# Patient Record
Sex: MALE | Race: WHITE | NOT HISPANIC OR LATINO | Employment: STUDENT | URBAN - METROPOLITAN AREA
[De-identification: names, ages, dates, MRNs, and addresses within clinical notes are randomized per-mention and may not be internally consistent; named-entity substitution may affect disease eponyms.]

---

## 2023-09-25 PROBLEM — F40.10 SOCIAL ANXIETY DISORDER: Status: ACTIVE | Noted: 2023-09-25

## 2023-09-25 PROBLEM — G47.21 DELAYED SLEEP PHASE SYNDROME: Status: ACTIVE | Noted: 2023-09-25

## 2023-09-25 PROBLEM — F41.9 ANXIETY AND DEPRESSION: Status: ACTIVE | Noted: 2023-09-25

## 2023-09-25 PROBLEM — F32.A ANXIETY AND DEPRESSION: Status: ACTIVE | Noted: 2023-09-25

## 2023-09-25 PROBLEM — R00.0 TACHYCARDIA, UNSPECIFIED: Status: ACTIVE | Noted: 2023-09-25

## 2023-09-25 PROBLEM — L85.8 KERATOSIS PILARIS: Status: ACTIVE | Noted: 2023-09-25

## 2023-09-25 PROBLEM — E55.9 VITAMIN D DEFICIENCY: Status: ACTIVE | Noted: 2023-09-25

## 2023-09-25 PROBLEM — R63.5 WEIGHT GAIN, ABNORMAL: Status: ACTIVE | Noted: 2023-09-25

## 2023-09-25 RX ORDER — SERTRALINE HYDROCHLORIDE 25 MG/1
1 TABLET, FILM COATED ORAL DAILY
COMMUNITY
Start: 2022-10-13 | End: 2023-11-07 | Stop reason: WASHOUT

## 2023-09-25 RX ORDER — ERGOCALCIFEROL 1.25 MG/1
CAPSULE ORAL
COMMUNITY
Start: 2020-06-02 | End: 2023-10-12 | Stop reason: SDUPTHER

## 2023-09-25 RX ORDER — ESCITALOPRAM OXALATE 5 MG/1
1 TABLET ORAL DAILY
COMMUNITY
Start: 2023-04-11 | End: 2023-11-07 | Stop reason: WASHOUT

## 2023-09-25 RX ORDER — PROPRANOLOL HYDROCHLORIDE 10 MG/1
10 TABLET ORAL DAILY PRN
COMMUNITY
Start: 2023-01-04 | End: 2023-11-07 | Stop reason: WASHOUT

## 2023-09-25 RX ORDER — ACETAMINOPHEN 500 MG
50 TABLET ORAL DAILY
COMMUNITY
Start: 2017-03-09

## 2023-09-25 RX ORDER — BUSPIRONE HYDROCHLORIDE 5 MG/1
1 TABLET ORAL 2 TIMES DAILY
COMMUNITY
Start: 2023-05-16 | End: 2023-11-07 | Stop reason: WASHOUT

## 2023-09-25 RX ORDER — SKIN PROTECTANT 44 G/100G
1 OINTMENT TOPICAL 3 TIMES DAILY
COMMUNITY
Start: 2020-12-07 | End: 2023-11-07 | Stop reason: WASHOUT

## 2023-10-02 ENCOUNTER — APPOINTMENT (OUTPATIENT)
Dept: BEHAVIORAL HEALTH | Facility: CLINIC | Age: 18
End: 2023-10-02
Payer: COMMERCIAL

## 2023-10-02 ENCOUNTER — OFFICE VISIT (OUTPATIENT)
Dept: PRIMARY CARE | Facility: CLINIC | Age: 18
End: 2023-10-02
Payer: COMMERCIAL

## 2023-10-02 VITALS
HEIGHT: 68 IN | WEIGHT: 118 LBS | OXYGEN SATURATION: 98 % | TEMPERATURE: 96.7 F | BODY MASS INDEX: 17.88 KG/M2 | SYSTOLIC BLOOD PRESSURE: 120 MMHG | DIASTOLIC BLOOD PRESSURE: 78 MMHG | HEART RATE: 80 BPM

## 2023-10-02 DIAGNOSIS — F41.9 ANXIETY AND DEPRESSION: ICD-10-CM

## 2023-10-02 DIAGNOSIS — F32.A ANXIETY AND DEPRESSION: ICD-10-CM

## 2023-10-02 DIAGNOSIS — Z13.6 SCREENING FOR CARDIOVASCULAR CONDITION: ICD-10-CM

## 2023-10-02 DIAGNOSIS — Z11.3 ROUTINE SCREENING FOR STI (SEXUALLY TRANSMITTED INFECTION): ICD-10-CM

## 2023-10-02 DIAGNOSIS — Z00.00 PREVENTATIVE HEALTH CARE: Primary | ICD-10-CM

## 2023-10-02 DIAGNOSIS — H61.23 BILATERAL IMPACTED CERUMEN: ICD-10-CM

## 2023-10-02 DIAGNOSIS — K21.9 GASTROESOPHAGEAL REFLUX DISEASE, UNSPECIFIED WHETHER ESOPHAGITIS PRESENT: ICD-10-CM

## 2023-10-02 DIAGNOSIS — E55.9 VITAMIN D DEFICIENCY: ICD-10-CM

## 2023-10-02 PROCEDURE — 99385 PREV VISIT NEW AGE 18-39: CPT | Performed by: STUDENT IN AN ORGANIZED HEALTH CARE EDUCATION/TRAINING PROGRAM

## 2023-10-02 PROCEDURE — 99385 PREV VISIT NEW AGE 18-39: CPT | Mod: 25 | Performed by: STUDENT IN AN ORGANIZED HEALTH CARE EDUCATION/TRAINING PROGRAM

## 2023-10-02 PROCEDURE — 1036F TOBACCO NON-USER: CPT | Performed by: STUDENT IN AN ORGANIZED HEALTH CARE EDUCATION/TRAINING PROGRAM

## 2023-10-02 PROCEDURE — 90471 IMMUNIZATION ADMIN: CPT | Performed by: STUDENT IN AN ORGANIZED HEALTH CARE EDUCATION/TRAINING PROGRAM

## 2023-10-02 RX ORDER — PANTOPRAZOLE SODIUM 20 MG/1
20 TABLET, DELAYED RELEASE ORAL
Qty: 30 TABLET | Refills: 1 | Status: SHIPPED | OUTPATIENT
Start: 2023-10-02 | End: 2023-11-07

## 2023-10-02 ASSESSMENT — PAIN SCALES - GENERAL: PAINLEVEL: 0-NO PAIN

## 2023-10-02 NOTE — PROGRESS NOTES
"Subjective   Alalessandro Patel is a 18 y.o. male who presents for Annual Exam.    HPI:    This is a 18-year-old male presenting as a new patient for physical exam.    History:  Previously saw Dr. Dr. Josiane Gomez as his pediatrician.  Works at [employer].  Took a couple of classes at [employer] previously. Not currently in school.    Depression/anxiety:  Compliant with sertraline 25 mg daily as prescribed.  Sees Roverto Cee CNP with  psych     Nocturnal Burning Sx:  Patient complains of burning sensation through chest occurring only when he lays down at night.  States that this has been ongoing for several years, remembers it first occurring in ninth grade.  Has never taken anything for this issue, no known history of heartburn.  No associated chest pain or shortness of breath.  Does not have similar symptoms at other times during the day or following meals.      JULIEN  Review of systems is essentially negative for all systems except for any identified issues in HPI above.      Objective     /78   Pulse 80   Temp 35.9 °C (96.7 °F)   Ht 1.727 m (5' 8\")   Wt 53.5 kg (118 lb)   SpO2 98%   BMI 17.94 kg/m²      PHYSICAL EXAM    GENERAL  Well-appearing, pleasant and cooperative.  No acute distress.    HEENT  HEAD:   Normocephalic.  Atraumatic.  EYES:  PERRLA.  No scleral icterus or conjunctival injection.  EARS:  +BILATERAL CERUMEN IMPACTION  NECK:  No adenopathy.  No palpable thyroid enlargement or nodules.    THROAT:  Moist oropharynx without tonsillar enlargement or exudates.    LUNGS:    Clear to auscultation bilaterally.  No wheezes, rales, rhonchi.    CARDIAC:  Regular rate and rhythm.  Normal S1S2.  No murmurs/rubs/gallops.    ABDOMEN:  Soft, non-tender, non-distended.  No hepatosplenomegaly.  Normoactive bowel sounds.    MUSCULOSKELETAL:  No gross abnormalities.   No joint swelling or erythema,.  No spinal or paraspinal tenderness to palpation.    EXTREMITIES:  No LE edema or cyanosis.      NEURO          "  Alert and oriented x3. No focal deficits.    PSYCH:          Affect appropriate.           Assessment/Plan   Problem List Items Addressed This Visit          Endocrine/Metabolic    Vitamin D deficiency    Relevant Orders    Vitamin D 25-Hydroxy,Total (for eval of Vitamin D levels)       Mental Health    Anxiety and depression     Well-controlled with current sertraline dose.  Sees mental health provider.  Continue current management.          Other Visit Diagnoses       Preventative health care    -  Primary    Relevant Orders    CBC    Comprehensive metabolic panel    Tsh With Reflex To Free T4 If Abnormal    Lipid panel    Hepatitis C antibody    Screening for cardiovascular condition        Relevant Orders    Lipid panel    Routine screening for STI (sexually transmitted infection)        Relevant Orders    HIV 1/2 Antigen/Antibody Screen with Reflex to Confirmation    C. trachomatis / N. gonorrhoeae, DNA probe    Trichomonas vaginalis, Amplified    Syphilis Screen with Reflex    HSV Type I/II IgM Antibody    HSV1 IgG and HSV2 IgG    Gastroesophageal reflux disease, unspecified whether esophagitis present        Suspect that this is the cause of burning sensation.  No other chest pain/shortness of breath.    Relevant Medications    pantoprazole (Protonix) 20 mg EC tablet    Bilateral impacted cerumen        Noted on physical exam, patient does complain of muffled hearing.  Cerumen lavage performed bilaterally in clinic today.    Relevant Orders    Ear cerumen removal                 Lili Butcher MD

## 2023-10-02 NOTE — ASSESSMENT & PLAN NOTE
Well-controlled with current sertraline dose.  Sees mental health provider.  Continue current management.

## 2023-10-02 NOTE — PROGRESS NOTES
"Last visit on 8/20/23 - began tapering off buspar d/t side effects (dizziness, negative thoughts). Recommended PCP visit and lab work   DSM-5 diagnosis social anxiety  Current Psychotropic Medications: none     HPI   Location/symptoms/domain  Quality: Descriptive language   Severity/impact on functioning   Timing/context   Duration - timeline of illness   Recent Stressors:   Alleviating Factors:   Associated signs and symptoms     Psychiatric Review Of Systems:  Depressive Symptoms:{Depressive symptoms:10339::\"N/A\"}  Manic Symptoms:{Manic Symptoms:87397}  Anxiety Symptoms:{Anxiety Symptoms:63246}  Disordered Eating Symptoms:{Disordered Eating Symptoms:01964}  Inattentive Symptoms:{Inattentive Symptoms:35764}   Hyperactive/Impulsive Symptoms:{Hyperactive/Impulsive Symp[toms:86537}  Oppositional Defiant Symptoms:{Oppositional Defiant Symptoms:52318}  Trauma Symptoms:{Trauma Symptoms:43306}  Conduct Issues:{Conduct Issues:98178}  Psychotic Symptoms:{Psychotic Symptoms:20293}  Developmental Concerns:{Developmental Concerns:09206}  Other Symptoms/Concerns:{Other Symptoms/Concerns:84763}  Delirium/Altered Mental Status Symptoms:{Delirium/Altered Mental Status Symptoms:80333}    Exercise:   Diet/Nutrition:   Support System:          Mental Status Exam:   Appearance: appropriately groomed and dressed. not impaired   Behavior: cooperative and friendly/ pleasant.   Speech: regular rate, rhythm, volume and tone, spontaneous, fluent.   Eye Contact: normal.   Motor Activity: normal.   Affect: appropriate with full range.   Mood: anxious.   Thought Process: logical and linear.   Thought Content: does not endorse having suicidal ideations, does not endorse having homicidal ideations and does not endorse having delusions.   Orientation Impairment: time, person and place.   Memory Impairment: none.   Perception: no hallucinations or delusions   Cognition: no deficits in attention, concentration or language   Insight: fair. "   Judgment: fair     Plan/Recommendations:  Medications: ***  Orders: ***  Follow up: ***  Call  Psychiatry at [phone] with issues.  For [facility] residents, [place] is a 24/7 hotline you can call for assistance [[phone]]. Please call [phone] or go to your closest Emergency Room if you feel unsafe. This includes thoughts of hurting yourself or anyone else, or having other troubles such as hearing voices, seeing visions, or having new and scary thoughts about the people around you.      Patient arrival time   Total time   Record Review: {brief/mod/extensive:96770}

## 2023-10-02 NOTE — PATIENT INSTRUCTIONS
Thank you for coming to see me today.    Flu shot in clinic today.  COVID vaccination also recommended, this is available at most local pharmacies.    Ear lavage in clinic today.  Avoid use of Q-tips in the future as this can push earwax farther back anterior canal.    Go to the lab for fasting blood work, we will call you with the results.    Heartburn medication, pantoprazole, sent to pharmacy.  Take daily as prescribed.

## 2023-10-10 ENCOUNTER — LAB (OUTPATIENT)
Dept: LAB | Facility: LAB | Age: 18
End: 2023-10-10
Payer: COMMERCIAL

## 2023-10-10 DIAGNOSIS — Z13.6 SCREENING FOR CARDIOVASCULAR CONDITION: ICD-10-CM

## 2023-10-10 DIAGNOSIS — E55.9 VITAMIN D DEFICIENCY: ICD-10-CM

## 2023-10-10 DIAGNOSIS — Z00.00 PREVENTATIVE HEALTH CARE: ICD-10-CM

## 2023-10-10 DIAGNOSIS — Z11.3 ROUTINE SCREENING FOR STI (SEXUALLY TRANSMITTED INFECTION): ICD-10-CM

## 2023-10-10 LAB
25(OH)D3 SERPL-MCNC: 10 NG/ML (ref 31–100)
ALBUMIN SERPL-MCNC: 5.1 G/DL (ref 3.5–5)
ALP BLD-CCNC: 54 U/L (ref 35–125)
ALT SERPL-CCNC: 10 U/L (ref 5–40)
ANION GAP SERPL CALC-SCNC: 12 MMOL/L
AST SERPL-CCNC: 14 U/L (ref 5–40)
BILIRUB SERPL-MCNC: 0.3 MG/DL (ref 0.1–1.2)
BUN SERPL-MCNC: 9 MG/DL (ref 8–25)
CALCIUM SERPL-MCNC: 9.7 MG/DL (ref 8.5–10.4)
CHLORIDE SERPL-SCNC: 105 MMOL/L (ref 97–107)
CHOLEST SERPL-MCNC: 115 MG/DL (ref 133–200)
CHOLEST/HDLC SERPL: 2.2 {RATIO}
CO2 SERPL-SCNC: 27 MMOL/L (ref 24–31)
CREAT SERPL-MCNC: 0.8 MG/DL (ref 0.4–1.6)
ERYTHROCYTE [DISTWIDTH] IN BLOOD BY AUTOMATED COUNT: 12.4 % (ref 11.5–14.5)
GFR SERPL CREATININE-BSD FRML MDRD: >90 ML/MIN/1.73M*2
GLUCOSE SERPL-MCNC: 99 MG/DL (ref 65–99)
HCT VFR BLD AUTO: 44.5 % (ref 41–52)
HCV AB SER QL: NONREACTIVE
HDLC SERPL-MCNC: 52 MG/DL
HERPES SIMPLEX VIRUS 1 IGG: <0.2 INDEX
HERPES SIMPLEX VIRUS 2 IGG: <0.2 INDEX
HGB BLD-MCNC: 15.1 G/DL (ref 13.5–17.5)
LDLC SERPL CALC-MCNC: 55 MG/DL (ref 65–130)
MCH RBC QN AUTO: 30.6 PG (ref 26–34)
MCHC RBC AUTO-ENTMCNC: 33.9 G/DL (ref 32–36)
MCV RBC AUTO: 90 FL (ref 80–100)
NRBC BLD-RTO: 0 /100 WBCS (ref 0–0)
PLATELET # BLD AUTO: 165 X10*3/UL (ref 150–450)
PMV BLD AUTO: 11.6 FL (ref 7.5–11.5)
POTASSIUM SERPL-SCNC: 4.1 MMOL/L (ref 3.4–5.1)
PROT SERPL-MCNC: 7.3 G/DL (ref 5.9–7.9)
RBC # BLD AUTO: 4.94 X10*6/UL (ref 4.5–5.9)
SODIUM SERPL-SCNC: 144 MMOL/L (ref 133–145)
T PALLIDUM AB SER QL: NONREACTIVE
TRIGL SERPL-MCNC: 40 MG/DL (ref 40–150)
TSH SERPL DL<=0.05 MIU/L-ACNC: 1.76 MIU/L (ref 0.27–4.2)
WBC # BLD AUTO: 4 X10*3/UL (ref 4.4–11.3)

## 2023-10-10 PROCEDURE — 82306 VITAMIN D 25 HYDROXY: CPT

## 2023-10-10 PROCEDURE — 87661 TRICHOMONAS VAGINALIS AMPLIF: CPT

## 2023-10-10 PROCEDURE — 80053 COMPREHEN METABOLIC PANEL: CPT

## 2023-10-10 PROCEDURE — 86695 HERPES SIMPLEX TYPE 1 TEST: CPT

## 2023-10-10 PROCEDURE — 84443 ASSAY THYROID STIM HORMONE: CPT

## 2023-10-10 PROCEDURE — 86803 HEPATITIS C AB TEST: CPT

## 2023-10-10 PROCEDURE — 87800 DETECT AGNT MULT DNA DIREC: CPT

## 2023-10-10 PROCEDURE — 80061 LIPID PANEL: CPT

## 2023-10-10 PROCEDURE — 86780 TREPONEMA PALLIDUM: CPT

## 2023-10-10 PROCEDURE — 86694 HERPES SIMPLEX NES ANTBDY: CPT

## 2023-10-10 PROCEDURE — 87389 HIV-1 AG W/HIV-1&-2 AB AG IA: CPT

## 2023-10-10 PROCEDURE — 85027 COMPLETE CBC AUTOMATED: CPT

## 2023-10-10 PROCEDURE — 36415 COLL VENOUS BLD VENIPUNCTURE: CPT

## 2023-10-10 PROCEDURE — 86696 HERPES SIMPLEX TYPE 2 TEST: CPT

## 2023-10-11 LAB
C TRACH RRNA SPEC QL NAA+PROBE: NEGATIVE
HIV 1+2 AB+HIV1 P24 AG SERPL QL IA: NONREACTIVE
N GONORRHOEA DNA SPEC QL PROBE+SIG AMP: NEGATIVE
T VAGINALIS RRNA SPEC QL NAA+PROBE: NEGATIVE

## 2023-10-12 LAB — HSV1+2 IGM SER IA-ACNC: 1.02 IV

## 2023-10-12 RX ORDER — ERGOCALCIFEROL 1.25 MG/1
50000 CAPSULE ORAL
Qty: 12 CAPSULE | Refills: 0 | Status: SHIPPED | OUTPATIENT
Start: 2023-10-12 | End: 2024-01-04

## 2023-11-07 ENCOUNTER — OFFICE VISIT (OUTPATIENT)
Dept: PRIMARY CARE | Facility: CLINIC | Age: 18
End: 2023-11-07
Payer: COMMERCIAL

## 2023-11-07 VITALS
TEMPERATURE: 97 F | WEIGHT: 118 LBS | HEIGHT: 68 IN | OXYGEN SATURATION: 99 % | BODY MASS INDEX: 17.88 KG/M2 | DIASTOLIC BLOOD PRESSURE: 70 MMHG | HEART RATE: 73 BPM | SYSTOLIC BLOOD PRESSURE: 110 MMHG

## 2023-11-07 DIAGNOSIS — B00.9 HSV (HERPES SIMPLEX VIRUS) INFECTION: ICD-10-CM

## 2023-11-07 DIAGNOSIS — Z23 ENCOUNTER FOR IMMUNIZATION: ICD-10-CM

## 2023-11-07 DIAGNOSIS — R07.89 CHEST DISCOMFORT: Primary | ICD-10-CM

## 2023-11-07 DIAGNOSIS — E55.9 VITAMIN D DEFICIENCY: ICD-10-CM

## 2023-11-07 PROCEDURE — 1036F TOBACCO NON-USER: CPT | Performed by: STUDENT IN AN ORGANIZED HEALTH CARE EDUCATION/TRAINING PROGRAM

## 2023-11-07 PROCEDURE — 90686 IIV4 VACC NO PRSV 0.5 ML IM: CPT | Performed by: STUDENT IN AN ORGANIZED HEALTH CARE EDUCATION/TRAINING PROGRAM

## 2023-11-07 PROCEDURE — 93010 ELECTROCARDIOGRAM REPORT: CPT | Performed by: STUDENT IN AN ORGANIZED HEALTH CARE EDUCATION/TRAINING PROGRAM

## 2023-11-07 PROCEDURE — 99214 OFFICE O/P EST MOD 30 MIN: CPT | Performed by: STUDENT IN AN ORGANIZED HEALTH CARE EDUCATION/TRAINING PROGRAM

## 2023-11-07 PROCEDURE — 99214 OFFICE O/P EST MOD 30 MIN: CPT | Mod: 25 | Performed by: STUDENT IN AN ORGANIZED HEALTH CARE EDUCATION/TRAINING PROGRAM

## 2023-11-07 PROCEDURE — 93005 ELECTROCARDIOGRAM TRACING: CPT | Performed by: STUDENT IN AN ORGANIZED HEALTH CARE EDUCATION/TRAINING PROGRAM

## 2023-11-07 ASSESSMENT — PATIENT HEALTH QUESTIONNAIRE - PHQ9
SUM OF ALL RESPONSES TO PHQ9 QUESTIONS 1 AND 2: 0
1. LITTLE INTEREST OR PLEASURE IN DOING THINGS: NOT AT ALL
2. FEELING DOWN, DEPRESSED OR HOPELESS: NOT AT ALL

## 2023-11-07 ASSESSMENT — PAIN SCALES - GENERAL: PAINLEVEL: 0-NO PAIN

## 2023-11-07 NOTE — PROGRESS NOTES
"Subjective   Alalessandro Patel is a 18 y.o. male who presents for f/u med and vitamin.    HPI:      18-year-old male presenting for follow-up visit.  Seen by me for yearly physical on 10/2/2023, ear lavage performed at that time.    Labs performed after physical notable for +HSV IGM, vit D deficiency (10).    No change in symptoms of \"warmth in chest\" with pantoprazole.  No pain but warmth in chest.  Happens every night.  Also when standing for a long time  Feels better with pressure. No association with food intake.  No associated anxiety but does feel like heart is racing during these times.  No SOB.  No hx of EKG that he recalls, none on file.    ROS:   Review of systems is essentially negative for all systems except for any identified issues in HPI above.    Objective     /70   Pulse 73   Temp 36.1 °C (97 °F)   Ht 1.727 m (5' 8\")   Wt 53.5 kg (118 lb)   SpO2 99%   BMI 17.94 kg/m²      PHYSICAL EXAM    GENERAL  Well-appearing, pleasant and cooperative.  No acute distress.    HEENT  HEAD:   Normocephalic.  Atraumatic.  EYES:  PERRLA.  No scleral icterus or conjunctival injection.  NECK:  No adenopathy.  No palpable thyroid enlargement or nodules.    THROAT:  Moist oropharynx without tonsillar enlargement or exudates.    LUNGS:    Clear to auscultation bilaterally.  No wheezes, rales, rhonchi.    CARDIAC:  Regular rate and rhythm.  Normal S1S2.  No murmurs/rubs/gallops.    ABDOMEN:  Soft, non-tender, non-distended.  No hepatosplenomegaly.  Normoactive bowel sounds.    MUSCULOSKELETAL:  No gross abnormalities.   No joint swelling or erythema,.  No spinal or paraspinal tenderness to palpation.    EXTREMITIES:  No LE edema or cyanosis.      NEURO           Alert and oriented x3. No focal deficits.    PSYCH:          Affect appropriate.           Assessment/Plan   Problem List Items Addressed This Visit          Endocrine/Metabolic    Vitamin D deficiency     Other Visit Diagnoses       Chest discomfort    " -  Primary    Relevant Orders    ECG 12 lead (Clinic Performed)    Referral to Cardiology    HSV (herpes simplex virus) infection        Encounter for immunization        Relevant Orders    Flu vaccine (IIV4) age 6 months and greater, preservative free (Completed)                 Lili Butcher MD

## 2023-11-07 NOTE — PROGRESS NOTES
Pt would like flu bshot    Immunization History   Administered Date(s) Administered    DTP 09/23/2008    DTaP / HiB / IPV 2003    DTaP vaccine, pediatric  (INFANRIX) 09/23/2008    DTaP, Unspecified 01/05/2004, 02/20/2004, 11/20/2004    Flu vaccine (IIV4), preservative free *Check age/dose* 10/02/2023    HPV 9-valent vaccine (GARDASIL 9) 01/19/2018    HPV, Quadrivalent 04/22/2015, 11/09/2015    Hepatitis A vaccine, pediatric/adolescent (HAVRIX, VAQTA) 09/04/2007, 09/23/2008    Hepatitis B vaccine, pediatric/adolescent (RECOMBIVAX, ENGERIX) 2003, 2003, 05/24/2004    HiB, unspecified 01/05/2004, 02/20/2004, 11/20/2004    Influenza Whole 09/29/2009    Influenza, Unspecified 10/14/2014, 11/03/2017    Influenza, seasonal, injectable 11/20/2004, 09/22/2011, 11/09/2015, 10/19/2016    Influenza, seasonal, injectable, preservative free 10/02/2013    MMR vaccine, subcutaneous (MMR II) 08/23/2004, 08/23/2005    Meningococcal ACWY vaccine (MENVEO) 06/01/2020    Meningococcal B vaccine (BEXSERO) 08/04/2021    Meningococcal MCV4P 04/22/2015    Pfizer Purple Cap SARS-CoV-2 04/19/2021, 05/10/2021    Pneumococcal Conjugate PCV 7 2003, 01/05/2004, 05/24/2004, 11/20/2004    Polio, Unspecified 09/23/2008    Poliovirus vaccine, subcutaneous (IPOL) 01/05/2004, 02/22/2005, 09/23/2008    Tdap vaccine, age 7 year and older (BOOSTRIX) 11/21/2014    Varicella vaccine, subcutaneous (VARIVAX) 08/23/2004, 09/04/2007

## 2023-11-07 NOTE — PATIENT INSTRUCTIONS
Thank you for coming to see me today.    Flu shot in clinic today.    Cardiology referral entered today, call to schedule.    Okay to stop taking pantoprazole.    Continue taking weekly vitamin D supplement as prescribed.  Go to lab for repeat vitamin D level 1 week after finishing this 12-week course.    Due to positive HSV (herpes) antibodies, let me know if you develop symptoms of blisters in the mouth or genital area.  Avoid any close contact with affected area with others, safe sex practices encouraged at all times.  Antiviral medications available to treat symptoms as we discussed.

## 2024-01-30 ENCOUNTER — OFFICE VISIT (OUTPATIENT)
Dept: CARDIOLOGY | Facility: HOSPITAL | Age: 19
End: 2024-01-30
Payer: COMMERCIAL

## 2024-01-30 VITALS
HEIGHT: 68 IN | SYSTOLIC BLOOD PRESSURE: 125 MMHG | WEIGHT: 114 LBS | HEART RATE: 80 BPM | OXYGEN SATURATION: 99 % | DIASTOLIC BLOOD PRESSURE: 76 MMHG | BODY MASS INDEX: 17.28 KG/M2

## 2024-01-30 DIAGNOSIS — R07.89 CHEST DISCOMFORT: Primary | ICD-10-CM

## 2024-01-30 PROCEDURE — 99213 OFFICE O/P EST LOW 20 MIN: CPT | Performed by: HOSPITALIST

## 2024-01-30 PROCEDURE — 93010 ELECTROCARDIOGRAM REPORT: CPT | Performed by: INTERNAL MEDICINE

## 2024-01-30 PROCEDURE — 1036F TOBACCO NON-USER: CPT | Performed by: HOSPITALIST

## 2024-01-30 PROCEDURE — 99203 OFFICE O/P NEW LOW 30 MIN: CPT | Performed by: HOSPITALIST

## 2024-01-30 PROCEDURE — 93005 ELECTROCARDIOGRAM TRACING: CPT | Performed by: HOSPITALIST

## 2024-01-30 NOTE — PROGRESS NOTES
Subjective   Al Patel is a 18 y.o. male with no significant PMH, who is here for evaluation for chest pain.  Patient stated that he has been having chest pain for the last few years, on and off, almost every night when he lays down in bed, each episode last at least couple hours and sometimes a whole night, feels like a warm tightness in the chest with no radiation or other associated symptoms, feels better when he puts pressure on his chest, sometimes gets worse with deep breath.  Patient is active and exercises without any cardiovascular symptoms.  Patient does not feel his symptoms are impacted with eating late at night or eating a large meal.  Blood pressure today is 125/76 mmHg.    EKG in the clinic today 1/30/2024, reviewed by myself, showed normal sinus rhythm and normal EKG.    Review of Systems  ROS is negative other than in HPI.      Objective   Physical Exam  General: NAD  HEENT: IEOM, PERRL   Neck: No JVD or carotid bruit  Lungs: CTAB  Heart: RRR, normal S1 and S2, no loud murmurs  Abdomen: Soft, nontender, positive bowel sounds  Extremities: No edema  Neurologic: No FND  Psychiatric: Normal mood and affect  Symmetrical radial and carotid pulses.    Assessment/Plan   1-chest pain:  -See HPI for details, noncardiac in origin.  Patient also has symmetrical radial and carotid pulses.  -EKG in the clinic today 1/30/2024, reviewed by myself, showed normal sinus rhythm and normal EKG.  -Further workup and management as per PCP.     RTC as needed    Vicky Gerber MD

## 2024-01-30 NOTE — PATIENT INSTRUCTIONS
Thank you so much for visiting us today.    Your chest discomfort is not related to your heart.  Please talk to your primary care physician for further workup and management. It could be related to acid reflux.    Please feel free to call our office at [phone] if you have any questions or concerns.

## 2024-01-31 LAB
ATRIAL RATE: 80 BPM
P AXIS: 76 DEGREES
P OFFSET: 202 MS
P ONSET: 157 MS
PR INTERVAL: 124 MS
Q ONSET: 219 MS
QRS COUNT: 14 BEATS
QRS DURATION: 80 MS
QT INTERVAL: 352 MS
QTC CALCULATION(BAZETT): 405 MS
QTC FREDERICIA: 387 MS
R AXIS: 69 DEGREES
T AXIS: 66 DEGREES
T OFFSET: 395 MS
VENTRICULAR RATE: 80 BPM

## 2024-06-26 ENCOUNTER — APPOINTMENT (OUTPATIENT)
Dept: PRIMARY CARE | Facility: CLINIC | Age: 19
End: 2024-06-26
Payer: COMMERCIAL

## 2025-06-10 ENCOUNTER — APPOINTMENT (OUTPATIENT)
Dept: PRIMARY CARE | Facility: CLINIC | Age: 20
End: 2025-06-10
Payer: COMMERCIAL

## 2025-08-05 ENCOUNTER — APPOINTMENT (OUTPATIENT)
Dept: PRIMARY CARE | Facility: CLINIC | Age: 20
End: 2025-08-05
Payer: COMMERCIAL

## 2025-08-05 VITALS
BODY MASS INDEX: 17.43 KG/M2 | HEART RATE: 78 BPM | WEIGHT: 115 LBS | OXYGEN SATURATION: 98 % | SYSTOLIC BLOOD PRESSURE: 112 MMHG | HEIGHT: 68 IN | TEMPERATURE: 97 F | DIASTOLIC BLOOD PRESSURE: 70 MMHG

## 2025-08-05 DIAGNOSIS — K13.0 LIP ULCERATION: ICD-10-CM

## 2025-08-05 DIAGNOSIS — E55.9 VITAMIN D DEFICIENCY: ICD-10-CM

## 2025-08-05 DIAGNOSIS — Z13.6 SCREENING FOR CARDIOVASCULAR CONDITION: ICD-10-CM

## 2025-08-05 DIAGNOSIS — Z71.85 VACCINE COUNSELING: ICD-10-CM

## 2025-08-05 DIAGNOSIS — Z23 ENCOUNTER FOR IMMUNIZATION: ICD-10-CM

## 2025-08-05 DIAGNOSIS — Z00.00 ANNUAL PHYSICAL EXAM: Primary | ICD-10-CM

## 2025-08-05 DIAGNOSIS — Z11.3 ROUTINE SCREENING FOR STI (SEXUALLY TRANSMITTED INFECTION): ICD-10-CM

## 2025-08-05 PROCEDURE — 99395 PREV VISIT EST AGE 18-39: CPT | Mod: 25 | Performed by: STUDENT IN AN ORGANIZED HEALTH CARE EDUCATION/TRAINING PROGRAM

## 2025-08-05 PROCEDURE — 90472 IMMUNIZATION ADMIN EACH ADD: CPT | Performed by: STUDENT IN AN ORGANIZED HEALTH CARE EDUCATION/TRAINING PROGRAM

## 2025-08-05 PROCEDURE — 3008F BODY MASS INDEX DOCD: CPT | Performed by: STUDENT IN AN ORGANIZED HEALTH CARE EDUCATION/TRAINING PROGRAM

## 2025-08-05 PROCEDURE — 1036F TOBACCO NON-USER: CPT | Performed by: STUDENT IN AN ORGANIZED HEALTH CARE EDUCATION/TRAINING PROGRAM

## 2025-08-05 PROCEDURE — 99213 OFFICE O/P EST LOW 20 MIN: CPT | Performed by: STUDENT IN AN ORGANIZED HEALTH CARE EDUCATION/TRAINING PROGRAM

## 2025-08-05 PROCEDURE — 90715 TDAP VACCINE 7 YRS/> IM: CPT | Performed by: STUDENT IN AN ORGANIZED HEALTH CARE EDUCATION/TRAINING PROGRAM

## 2025-08-05 PROCEDURE — 99213 OFFICE O/P EST LOW 20 MIN: CPT | Mod: 25 | Performed by: STUDENT IN AN ORGANIZED HEALTH CARE EDUCATION/TRAINING PROGRAM

## 2025-08-05 PROCEDURE — 99395 PREV VISIT EST AGE 18-39: CPT | Performed by: STUDENT IN AN ORGANIZED HEALTH CARE EDUCATION/TRAINING PROGRAM

## 2025-08-05 ASSESSMENT — PROMIS GLOBAL HEALTH SCALE
RATE_AVERAGE_FATIGUE: MODERATE
RATE_SOCIAL_SATISFACTION: GOOD
RATE_PHYSICAL_HEALTH: FAIR
EMOTIONAL_PROBLEMS: OFTEN
RATE_AVERAGE_PAIN: 0
RATE_MENTAL_HEALTH: FAIR
RATE_GENERAL_HEALTH: GOOD
CARRYOUT_PHYSICAL_ACTIVITIES: COMPLETELY
RATE_QUALITY_OF_LIFE: GOOD
CARRYOUT_SOCIAL_ACTIVITIES: GOOD

## 2025-08-05 ASSESSMENT — PATIENT HEALTH QUESTIONNAIRE - PHQ9
1. LITTLE INTEREST OR PLEASURE IN DOING THINGS: NOT AT ALL
2. FEELING DOWN, DEPRESSED OR HOPELESS: NOT AT ALL
SUM OF ALL RESPONSES TO PHQ9 QUESTIONS 1 AND 2: 0

## 2025-08-05 ASSESSMENT — PAIN SCALES - GENERAL: PAINLEVEL_OUTOF10: 8

## 2025-08-05 NOTE — PROGRESS NOTES
Subjective   Al Patel is a 19 y.o. male who presents for Annual Exam.    HPI:      PREVENTATIVE HEALTH CARE:    Immunizations:  COVID:  DUE  TDaP:  DUE  Pneumonia:  not currently indicated.  Bexsero:  DOSE #2 DUE    Immunization History   Administered Date(s) Administered    DTP 09/23/2008    DTaP / HiB / IPV 2003    DTaP vaccine, pediatric  (INFANRIX) 09/23/2008    DTaP, Unspecified 01/05/2004, 02/20/2004, 11/20/2004    Flu vaccine (IIV4), preservative free *Check age/dose* 10/02/2023, 11/07/2023    Flu vaccine, trivalent, preservative free, age 6 months and greater (Fluarix/Fluzone/Flulaval) 10/02/2013    HPV 9-valent vaccine (GARDASIL 9) 01/19/2018    HPV, Quadrivalent 04/22/2015, 11/09/2015    Hepatitis A vaccine, pediatric/adolescent (HAVRIX, VAQTA) 09/04/2007, 09/23/2008    Hepatitis B vaccine, 19 yrs and under (RECOMBIVAX, ENGERIX) 2003, 2003, 05/24/2004    HiB, unspecified 01/05/2004, 02/20/2004, 11/20/2004    Influenza Whole 09/29/2009    Influenza, Unspecified 10/14/2014, 11/03/2017    Influenza, seasonal, injectable 11/20/2004, 09/22/2011, 11/09/2015, 10/19/2016    MMR vaccine, subcutaneous (MMR II) 08/23/2004, 08/23/2005    Meningococcal ACWY vaccine (MENVEO) 06/01/2020    Meningococcal ACWY-D (Menactra) 4-valent conjugate vaccine 04/22/2015    Meningococcal B vaccine (BEXSERO) 08/04/2021, 08/05/2025    Pfizer Purple Cap SARS-CoV-2 04/19/2021, 05/10/2021    Pneumococcal Conjugate PCV 7 2003, 01/05/2004, 05/24/2004, 11/20/2004    Polio, Unspecified 09/23/2008    Poliovirus vaccine, subcutaneous (IPOL) 01/05/2004, 02/22/2005, 09/23/2008    Tdap vaccine, age 7 year and older (BOOSTRIX, ADACEL) 11/21/2014, 08/05/2025    Varicella vaccine, subcutaneous (VARIVAX) 08/23/2004, 09/04/2007       Screenings:  HIV/HCV:  negative x2 10/10/2023 - utd  STI:   Requesting testing  Colonoscopy:  not currently indicated.      Mouth Ulcer:  Present since end of July.  On corner of R lower  "lip.  Concern for HSV, but thinks it is a canker sore/diet related.  Hx of similar lesions in the past.      ROS:    Review of systems is essentially negative for all systems except for any identified issues in HPI above.    Objective     /70   Pulse 78   Temp 36.1 °C (97 °F)   Ht 1.727 m (5' 8\")   Wt 52.2 kg (115 lb)   SpO2 98%   BMI 17.49 kg/m²      PHYSICAL EXAM    GENERAL  Well-appearing, pleasant and cooperative.  No acute distress.    HEENT  HEAD:   Normocephalic.  Atraumatic.  EYES:  PERRLA.  No scleral icterus or conjunctival injection.  EARS:  Tympanic membranes visualized bilaterally without erythema, fluid, or bulging.  NECK:  No adenopathy.  No palpable thyroid enlargement or nodules.    THROAT:  Shallow ulcer on inner R lower lip.  Moist oropharynx without tonsillar enlargement or exudates.    LUNGS:    Clear to auscultation bilaterally.  No wheezes, rales, rhonchi.    CARDIAC:  Regular rate and rhythm.  Normal S1S2.  No murmurs/rubs/gallops.    ABDOMEN:  Soft, non-tender, non-distended.  No hepatosplenomegaly.  Normoactive bowel sounds.    MUSCULOSKELETAL:  No gross abnormalities.   No joint swelling or erythema,.  No spinal or paraspinal tenderness to palpation.    EXTREMITIES:  No LE edema or cyanosis.      NEURO           Alert and oriented x3. No focal deficits.    PSYCH:          Affect appropriate.           Assessment/Plan   Problem List Items Addressed This Visit       Vitamin D deficiency    Relevant Orders    Vitamin D 25-Hydroxy,Total (for eval of Vitamin D levels)     Other Visit Diagnoses         Annual physical exam    -  Primary    Relevant Orders    Lipid Panel    TSH with reflex to Free T4 if abnormal    Comprehensive Metabolic Panel    CBC      Screening for cardiovascular condition        Relevant Orders    Lipid Panel      Vaccine counseling        Relevant Orders    Tdap vaccine, age 7 years and older  (BOOSTRIX) (Completed)    Meningococcal B vaccine (BEXSERO) " (Completed)      Encounter for immunization        Relevant Orders    Tdap vaccine, age 7 years and older  (BOOSTRIX) (Completed)    Meningococcal B vaccine (BEXSERO) (Completed)      Routine screening for STI (sexually transmitted infection)        Relevant Orders    C. trachomatis / N. gonorrhoeae, Amplified, Urogenital    Trichomonas vaginalis, Amplified    HIV 1/2 Antigen/Antibody Screen with Reflex to Confirmation    Syphilis Screen with Reflex    HSV1 IgG and HSV2 IgG      Lip ulceration        Relevant Orders    QUEST HSV, TYPE 1 AND 2 DNA, QN REAL TIME PCR            Patient Instructions   Thank you for coming to see me today.    Vaccines administered in clinic today:  - Tetanus booster (Tdap)  - Meningococcal B dose 2/2 (Bexsero)    I highly suspect your lip lesion is a canker sore due to irritating foods as we discussed, but we did do an HSV swab as well and will notify you of results.    Go to the lab for fasting blood work and urine testing, we will notify you of all results.    Follow up with me yearly for ROUTINE PHYSICAL.        Counseling:   Medication education:    Education: The patient is counseled regarding potential side effects of all new medications.    Understanding:  Patient expressed understanding.    Adherence:  No barriers to adherence identified.         Lili Butcher MD

## 2025-08-05 NOTE — PATIENT INSTRUCTIONS
Thank you for coming to see me today.    Vaccines administered in clinic today:  - Tetanus booster (Tdap)  - Meningococcal B dose 2/2 (Bexsero)    I highly suspect your lip lesion is a canker sore due to irritating foods as we discussed, but we did do an HSV swab as well and will notify you of results.    Go to the lab for fasting blood work and urine testing, we will notify you of all results.    Follow up with me yearly for ROUTINE PHYSICAL.

## 2025-08-08 LAB
HSV1 DNA SPEC QL NAA+PROBE: NOT DETECTED
HSV2 DNA SPEC QL NAA+PROBE: NOT DETECTED
SPECIMEN SOURCE: NORMAL